# Patient Record
Sex: FEMALE | Race: WHITE | NOT HISPANIC OR LATINO | Employment: FULL TIME | URBAN - METROPOLITAN AREA
[De-identification: names, ages, dates, MRNs, and addresses within clinical notes are randomized per-mention and may not be internally consistent; named-entity substitution may affect disease eponyms.]

---

## 2018-03-14 ENCOUNTER — OFFICE VISIT - HEALTHEAST (OUTPATIENT)
Dept: FAMILY MEDICINE | Facility: CLINIC | Age: 33
End: 2018-03-14

## 2018-03-14 ENCOUNTER — COMMUNICATION - HEALTHEAST (OUTPATIENT)
Dept: TELEHEALTH | Facility: CLINIC | Age: 33
End: 2018-03-14

## 2018-03-14 DIAGNOSIS — R51.9 HEADACHE: ICD-10-CM

## 2018-03-14 DIAGNOSIS — L72.3 SEBACEOUS CYST: ICD-10-CM

## 2018-03-14 DIAGNOSIS — D22.9 CHANGE IN MOLE: ICD-10-CM

## 2018-03-14 RX ORDER — CHLORAL HYDRATE 500 MG
2 CAPSULE ORAL DAILY
Status: ON HOLD | COMMUNITY
Start: 2018-03-14 | End: 2021-11-16

## 2018-03-14 ASSESSMENT — MIFFLIN-ST. JEOR: SCORE: 1420.23

## 2019-04-28 ENCOUNTER — RECORDS - HEALTHEAST (OUTPATIENT)
Dept: ADMINISTRATIVE | Facility: OTHER | Age: 34
End: 2019-04-28

## 2019-04-28 ENCOUNTER — ANESTHESIA - HEALTHEAST (OUTPATIENT)
Dept: OBGYN | Facility: HOSPITAL | Age: 34
End: 2019-04-28

## 2021-05-03 LAB
ABO (EXTERNAL): NORMAL
HEMOGLOBIN (EXTERNAL): 10.8 G/DL (ref 12–16)
HEPATITIS B SURFACE ANTIGEN (EXTERNAL): NONREACTIVE
HIV1+2 AB SERPL QL IA: NONREACTIVE
PLATELET COUNT (EXTERNAL): 278 10E3/UL (ref 150–400)
RH (EXTERNAL): POSITIVE
RUBELLA ANTIBODY IGG (EXTERNAL): NORMAL
TREPONEMA PALLIDUM ANTIBODY (EXTERNAL): NONREACTIVE
VDRL (SYPHILIS) (EXTERNAL): NONREACTIVE

## 2021-05-28 NOTE — ANESTHESIA PROCEDURE NOTES
Epidural Block    Patient location during procedure: OB  Time Called: 4/28/2019 8:35 AM  Reason for Block:labor epidural  Staffing:  Performing  Anesthesiologist: Farhana Turner MD  Preanesthetic Checklist  Completed: patient identified, risks, benefits, and alternatives discussed, timeout performed, consent obtained, at patient's request, airway assessed, oxygen available, suction available, emergency drugs available and hand hygiene performed  Procedure  Patient position: sitting  Prep: ChloraPrep and site prepped and draped  Patient monitoring: continuous pulse oximetry, heart rate and blood pressure  Approach: midline  Location: L2-L3  Injection technique: VERO saline  Number of Attempts:1  Needle  Needle type: Krystian   Needle gauge: 18 G     Catheter in Space: 6  Assessment  Sensory level:  No complications

## 2021-05-28 NOTE — ANESTHESIA POSTPROCEDURE EVALUATION
Patient: Cleopatra Peña    Anesthesia type: epidural    Patient location: Labor and Delivery  Last vitals: No vitals data found for the desired time range.    Post vital signs: stable  Level of consciousness: awake and responds to simple questions  Post-anesthesia pain: pain controlled  Post-anesthesia nausea and vomiting: no  Pulmonary: unassisted, return to baseline  Cardiovascular: stable and blood pressure at baseline  Hydration: adequate  Anesthetic events: no    QCDR Measures:  ASA# 11 - Iliana-op Cardiac Arrest: ASA11B - Patient did NOT experience unanticipated cardiac arrest  ASA# 12 - Iliana-op Mortality Rate: ASA12B - Patient did NOT die  ASA# 13 - PACU Re-Intubation Rate: NA - No ETT / LMA used for case  ASA# 10 - Composite Anes Safety: ASA10A - No serious adverse event    Additional Notes:

## 2021-05-28 NOTE — ANESTHESIA PREPROCEDURE EVALUATION
Anesthesia Evaluation      Patient summary reviewed   No history of anesthetic complications     Airway   Mallampati: II  Neck ROM: full   Pulmonary - negative ROS and normal exam                          Cardiovascular - negative ROS and normal exam   Neuro/Psych - negative ROS     Endo/Other    (+) pregnant     GI/Hepatic/Renal - negative ROS           Dental                         Anesthesia Plan  Planned anesthetic: epidural    ASA 2     Anesthetic plan and risks discussed with: patient    Post-op plan: epidural analgesia

## 2021-06-01 VITALS — WEIGHT: 162.13 LBS | HEIGHT: 65 IN | BODY MASS INDEX: 27.01 KG/M2

## 2021-06-16 PROBLEM — Z34.90 PREGNANT: Status: ACTIVE | Noted: 2019-04-28

## 2021-06-16 NOTE — PROGRESS NOTES
"  Chief Complaint   Patient presents with     Mass     Headache     Light headed, dizzy     Nevus     face and back         HPI:   Cleopatra Peña is a 32 y.o. female has bump on head for several years.  Has enlarged over the last year.  Slightly tender.  In the past when she had it checked, they didn't think it was a problem.  Has felt lightheaded with more headaches over the last month.  Has had some palpitations.  This has been over the last month.  \"hard to concentrate\" \"hard to focus\"  Has noticed this over the last month. Frontal sometimes back of neck location of headache.  Headache lasts a couple of hours.  Gets headache most days, usually later in day.  Usually associated with fatigued. No nausea.  No numbness or tingling.  No weakness of hand/leg.  Not always all at the same time.  No medication. HA  Similar to what she has had in the past, but much more recently.  No family history of headaches.  No fever.  Has a couple of moles of face that she thinks are changing color.  Also thinks she has had change on mole on back.    Moved here from [place] within last year.  New job--transitioning is stressful.  Has kids age 4 and 1.5 years.  Denies sadness, anhedonia, sleeping problems, change in appetite, suicidal ideation.  Denies anxiety.      ROS:  Constitutional: No fatigue, weakness, weight loss or gain, fevers, night sweats   Eyes: as per HPi  ENT: no hearing loss, ear pain, tinnitus or aural discharge. no sore throat, dental pain, hoarseness, dysphagia, oral or tongue lesions.    no nasal stuffiness, discharge, coryza or bleeding. No sinus pain.    Respiratory: No cough, chest pain, dyspnea, wheezing or hemoptysis.      CV: No exertional chest pain, dyspnea, palpitations, syncope, orthopnea, edema or paroxysmal nocturnal dyspnea.   GI: no abdominal or flank pain, anorexia, nausea or vomiting, dysphagia, change in bowel habits or black or bloody stools or weight loss.   : negative   SKIN: as per " "HPI  MS: No muscular pain, tenderness, redness, stiffness, swelling or pain in joints of upper or lower extremities, restriction in movement in extremities, injuries, back pain.    NEURO: as per HPi  HEME/LYMPH: No abnormal bruising or abnormal bleeding. No node swelling.   ENDO: No heat or cold intolerance.  No polyuria, polyphagia, polydipsia      Medications:  No current outpatient prescriptions on file prior to visit.     No current facility-administered medications on file prior to visit.          Social History:  Social History   Substance Use Topics     Smoking status: Never Smoker     Smokeless tobacco: Never Used     Alcohol use Not on file         Physical Exam:   Vitals:    03/14/18 0954   BP: 112/68   Pulse: 68   Resp: 16   Temp: 98.1  F (36.7  C)   TempSrc: Oral   Weight: 162 lb 2 oz (73.5 kg)   Height: 5' 5.25\" (1.657 m)       GENERAL:   Alert. Oriented.  EYES: Clear  HENT:  Ears: R TM pearly gray. Normal landmarks. L TM pearly gray.  Normal landmarks  Nose: Clear.  Sinuses: Nontender.  Oropharynx:  No erythema. No exudate.  NECK: Supple. No adenopathy.  LUNGS: Clear to ascultation.  No crackles.  No wheezing  HEART: RRR  SKIN:   2 mm raised brown papule on right chin, 3 mm raised brown papule on right chin, 4 mm raised brown papule on right back.  Margins smooth on all with even coloring throughout  5 mm flesh colored subq nodule on right side of top of scalp.  No fluctuance, no tenderness.  No erythema  ABDOMEN:  +BS. No tenderness. Soft, no guarding, rebound, rigidity,mass, or organomegaly. No CVA tenderness    NEURO: CN II-XII intact  Motor 5/5 throughout  Sensation intact to light touch  DTR's 2/4 throughout  Rapid alternating movements intact.  Finger to nose normal.  Heel to shin normal.  Romberg negative.  No pronator drift.  Heel toe walk normal.       Assessment/Plan:    1. Change in mole  Three moles that she thinks are changing in color.  They are all raised, small, smooth bordered and have " even color distribution.  Discussed that I don't think they look malignant.  Given a referral to dermatology if she doesn't want to continue to observe  - Ambulatory referral to Dermatology    2. Sebaceous cyst  Discussed that this is a benign lesion.  She can have it looked at by derm if desired.  Should be rechecked if drains, becomes red/inflammed or enlarges    3. Headache  No alarming signs or symptoms.  Likely related to stress/anxiety.  She was quite teary at the end of visit, but denies feeling depressed and other symptoms of depression.  Discussed stress reduction/relaxation, regular exercise.  Ice to back of neck and stretching exercises.  Offered counseling if desired.  Recheck for problems.           Meryl Pratt MD      3/14/2018

## 2021-08-22 ENCOUNTER — HEALTH MAINTENANCE LETTER (OUTPATIENT)
Age: 36
End: 2021-08-22

## 2021-08-24 ENCOUNTER — HOSPITAL ENCOUNTER (OUTPATIENT)
Facility: HOSPITAL | Age: 36
End: 2021-08-24
Payer: COMMERCIAL

## 2021-10-17 ENCOUNTER — HEALTH MAINTENANCE LETTER (OUTPATIENT)
Age: 36
End: 2021-10-17

## 2021-10-21 LAB — GROUP B STREPTOCOCCUS (EXTERNAL): NEGATIVE

## 2021-11-15 ENCOUNTER — HOSPITAL ENCOUNTER (INPATIENT)
Facility: CLINIC | Age: 36
LOS: 1 days | Discharge: HOME | End: 2021-11-17
Attending: OBSTETRICS & GYNECOLOGY | Admitting: OBSTETRICS & GYNECOLOGY
Payer: COMMERCIAL

## 2021-11-15 DIAGNOSIS — Z3A.39 39 WEEKS GESTATION OF PREGNANCY: Primary | ICD-10-CM

## 2021-11-16 PROBLEM — Z36.89 ENCOUNTER FOR TRIAGE IN PREGNANT PATIENT: Status: ACTIVE | Noted: 2021-11-16

## 2021-11-16 PROBLEM — Z34.90 PREGNANCY: Status: ACTIVE | Noted: 2021-11-16

## 2021-11-16 LAB
ABO/RH(D): NORMAL
ANTIBODY SCREEN: NEGATIVE
HOLD SPECIMEN: NORMAL
HOLD SPECIMEN: NORMAL
SARS-COV-2 RNA RESP QL NAA+PROBE: NEGATIVE
SPECIMEN EXPIRATION DATE: NORMAL

## 2021-11-16 PROCEDURE — 722N000001 HC LABOR CARE VAGINAL DELIVERY SINGLE

## 2021-11-16 PROCEDURE — 250N000011 HC RX IP 250 OP 636

## 2021-11-16 PROCEDURE — 86900 BLOOD TYPING SEROLOGIC ABO: CPT

## 2021-11-16 PROCEDURE — C9803 HOPD COVID-19 SPEC COLLECT: HCPCS

## 2021-11-16 PROCEDURE — 120N000001 HC R&B MED SURG/OB

## 2021-11-16 PROCEDURE — 87635 SARS-COV-2 COVID-19 AMP PRB: CPT | Performed by: OBSTETRICS & GYNECOLOGY

## 2021-11-16 PROCEDURE — 36415 COLL VENOUS BLD VENIPUNCTURE: CPT

## 2021-11-16 PROCEDURE — 250N000013 HC RX MED GY IP 250 OP 250 PS 637: Performed by: OBSTETRICS & GYNECOLOGY

## 2021-11-16 PROCEDURE — 10907ZC DRAINAGE OF AMNIOTIC FLUID, THERAPEUTIC FROM PRODUCTS OF CONCEPTION, VIA NATURAL OR ARTIFICIAL OPENING: ICD-10-PCS | Performed by: OBSTETRICS & GYNECOLOGY

## 2021-11-16 RX ORDER — MISOPROSTOL 200 UG/1
400 TABLET ORAL
Status: DISCONTINUED | OUTPATIENT
Start: 2021-11-16 | End: 2021-11-16 | Stop reason: HOSPADM

## 2021-11-16 RX ORDER — NALOXONE HYDROCHLORIDE 0.4 MG/ML
0.4 INJECTION, SOLUTION INTRAMUSCULAR; INTRAVENOUS; SUBCUTANEOUS
Status: DISCONTINUED | OUTPATIENT
Start: 2021-11-16 | End: 2021-11-16 | Stop reason: HOSPADM

## 2021-11-16 RX ORDER — MISOPROSTOL 200 UG/1
400 TABLET ORAL
Status: DISCONTINUED | OUTPATIENT
Start: 2021-11-16 | End: 2021-11-17 | Stop reason: HOSPADM

## 2021-11-16 RX ORDER — METHYLERGONOVINE MALEATE 0.2 MG/ML
200 INJECTION INTRAVENOUS
Status: DISCONTINUED | OUTPATIENT
Start: 2021-11-16 | End: 2021-11-17 | Stop reason: HOSPADM

## 2021-11-16 RX ORDER — KETOROLAC TROMETHAMINE 30 MG/ML
30 INJECTION, SOLUTION INTRAMUSCULAR; INTRAVENOUS
Status: DISCONTINUED | OUTPATIENT
Start: 2021-11-16 | End: 2021-11-17 | Stop reason: HOSPADM

## 2021-11-16 RX ORDER — OXYTOCIN/0.9 % SODIUM CHLORIDE 30/500 ML
340 PLASTIC BAG, INJECTION (ML) INTRAVENOUS CONTINUOUS PRN
Status: DISCONTINUED | OUTPATIENT
Start: 2021-11-16 | End: 2021-11-17 | Stop reason: HOSPADM

## 2021-11-16 RX ORDER — IBUPROFEN 800 MG/1
800 TABLET, FILM COATED ORAL EVERY 6 HOURS PRN
Status: DISCONTINUED | OUTPATIENT
Start: 2021-11-16 | End: 2021-11-17 | Stop reason: HOSPADM

## 2021-11-16 RX ORDER — OXYTOCIN 10 [USP'U]/ML
10 INJECTION, SOLUTION INTRAMUSCULAR; INTRAVENOUS
Status: DISCONTINUED | OUTPATIENT
Start: 2021-11-16 | End: 2021-11-17 | Stop reason: HOSPADM

## 2021-11-16 RX ORDER — IBUPROFEN 600 MG/1
600 TABLET, FILM COATED ORAL
Status: DISCONTINUED | OUTPATIENT
Start: 2021-11-16 | End: 2021-11-17 | Stop reason: HOSPADM

## 2021-11-16 RX ORDER — NALOXONE HYDROCHLORIDE 0.4 MG/ML
0.2 INJECTION, SOLUTION INTRAMUSCULAR; INTRAVENOUS; SUBCUTANEOUS
Status: DISCONTINUED | OUTPATIENT
Start: 2021-11-16 | End: 2021-11-16 | Stop reason: HOSPADM

## 2021-11-16 RX ORDER — OXYTOCIN/0.9 % SODIUM CHLORIDE 30/500 ML
100-340 PLASTIC BAG, INJECTION (ML) INTRAVENOUS CONTINUOUS PRN
Status: DISCONTINUED | OUTPATIENT
Start: 2021-11-16 | End: 2021-11-17 | Stop reason: HOSPADM

## 2021-11-16 RX ORDER — CARBOPROST TROMETHAMINE 250 UG/ML
250 INJECTION, SOLUTION INTRAMUSCULAR
Status: DISCONTINUED | OUTPATIENT
Start: 2021-11-16 | End: 2021-11-16 | Stop reason: HOSPADM

## 2021-11-16 RX ORDER — ONDANSETRON 2 MG/ML
4 INJECTION INTRAMUSCULAR; INTRAVENOUS EVERY 6 HOURS PRN
Status: DISCONTINUED | OUTPATIENT
Start: 2021-11-16 | End: 2021-11-16 | Stop reason: HOSPADM

## 2021-11-16 RX ORDER — ACETAMINOPHEN 325 MG/1
650 TABLET ORAL EVERY 4 HOURS PRN
Status: DISCONTINUED | OUTPATIENT
Start: 2021-11-16 | End: 2021-11-16 | Stop reason: HOSPADM

## 2021-11-16 RX ORDER — PROCHLORPERAZINE 25 MG
25 SUPPOSITORY, RECTAL RECTAL EVERY 12 HOURS PRN
Status: DISCONTINUED | OUTPATIENT
Start: 2021-11-16 | End: 2021-11-16 | Stop reason: HOSPADM

## 2021-11-16 RX ORDER — LIDOCAINE 40 MG/G
CREAM TOPICAL
Status: DISCONTINUED | OUTPATIENT
Start: 2021-11-16 | End: 2021-11-16 | Stop reason: HOSPADM

## 2021-11-16 RX ORDER — METOCLOPRAMIDE HYDROCHLORIDE 5 MG/ML
10 INJECTION INTRAMUSCULAR; INTRAVENOUS EVERY 6 HOURS PRN
Status: DISCONTINUED | OUTPATIENT
Start: 2021-11-16 | End: 2021-11-16 | Stop reason: HOSPADM

## 2021-11-16 RX ORDER — MISOPROSTOL 200 UG/1
800 TABLET ORAL
Status: DISCONTINUED | OUTPATIENT
Start: 2021-11-16 | End: 2021-11-17 | Stop reason: HOSPADM

## 2021-11-16 RX ORDER — METOCLOPRAMIDE 10 MG/1
10 TABLET ORAL EVERY 6 HOURS PRN
Status: DISCONTINUED | OUTPATIENT
Start: 2021-11-16 | End: 2021-11-16 | Stop reason: HOSPADM

## 2021-11-16 RX ORDER — PROCHLORPERAZINE MALEATE 10 MG
10 TABLET ORAL EVERY 6 HOURS PRN
Status: DISCONTINUED | OUTPATIENT
Start: 2021-11-16 | End: 2021-11-16 | Stop reason: HOSPADM

## 2021-11-16 RX ORDER — OXYTOCIN/0.9 % SODIUM CHLORIDE 30/500 ML
340 PLASTIC BAG, INJECTION (ML) INTRAVENOUS CONTINUOUS PRN
Status: DISCONTINUED | OUTPATIENT
Start: 2021-11-16 | End: 2021-11-16 | Stop reason: HOSPADM

## 2021-11-16 RX ORDER — MODIFIED LANOLIN
OINTMENT (GRAM) TOPICAL
Status: DISCONTINUED | OUTPATIENT
Start: 2021-11-16 | End: 2021-11-17 | Stop reason: HOSPADM

## 2021-11-16 RX ORDER — METHYLERGONOVINE MALEATE 0.2 MG/ML
200 INJECTION INTRAVENOUS
Status: DISCONTINUED | OUTPATIENT
Start: 2021-11-16 | End: 2021-11-16 | Stop reason: HOSPADM

## 2021-11-16 RX ORDER — CARBOPROST TROMETHAMINE 250 UG/ML
250 INJECTION, SOLUTION INTRAMUSCULAR
Status: DISCONTINUED | OUTPATIENT
Start: 2021-11-16 | End: 2021-11-17 | Stop reason: HOSPADM

## 2021-11-16 RX ORDER — HYDROCORTISONE 2.5 %
CREAM (GRAM) TOPICAL 3 TIMES DAILY PRN
Status: DISCONTINUED | OUTPATIENT
Start: 2021-11-16 | End: 2021-11-17 | Stop reason: HOSPADM

## 2021-11-16 RX ORDER — OXYTOCIN 10 [USP'U]/ML
10 INJECTION, SOLUTION INTRAMUSCULAR; INTRAVENOUS
Status: DISCONTINUED | OUTPATIENT
Start: 2021-11-16 | End: 2021-11-16 | Stop reason: HOSPADM

## 2021-11-16 RX ORDER — ACETAMINOPHEN 325 MG/1
650 TABLET ORAL EVERY 4 HOURS PRN
Status: DISCONTINUED | OUTPATIENT
Start: 2021-11-16 | End: 2021-11-17 | Stop reason: HOSPADM

## 2021-11-16 RX ORDER — MISOPROSTOL 200 UG/1
800 TABLET ORAL
Status: DISCONTINUED | OUTPATIENT
Start: 2021-11-16 | End: 2021-11-16 | Stop reason: HOSPADM

## 2021-11-16 RX ORDER — ONDANSETRON 4 MG/1
4 TABLET, ORALLY DISINTEGRATING ORAL EVERY 6 HOURS PRN
Status: DISCONTINUED | OUTPATIENT
Start: 2021-11-16 | End: 2021-11-16 | Stop reason: HOSPADM

## 2021-11-16 RX ORDER — BISACODYL 10 MG
10 SUPPOSITORY, RECTAL RECTAL DAILY PRN
Status: DISCONTINUED | OUTPATIENT
Start: 2021-11-16 | End: 2021-11-17 | Stop reason: HOSPADM

## 2021-11-16 RX ORDER — DOCUSATE SODIUM 100 MG/1
100 CAPSULE, LIQUID FILLED ORAL DAILY
Status: DISCONTINUED | OUTPATIENT
Start: 2021-11-16 | End: 2021-11-17 | Stop reason: HOSPADM

## 2021-11-16 RX ORDER — FENTANYL CITRATE 50 UG/ML
50-100 INJECTION, SOLUTION INTRAMUSCULAR; INTRAVENOUS
Status: DISCONTINUED | OUTPATIENT
Start: 2021-11-16 | End: 2021-11-16 | Stop reason: HOSPADM

## 2021-11-16 RX ADMIN — DOCUSATE SODIUM 100 MG: 100 CAPSULE, LIQUID FILLED ORAL at 09:05

## 2021-11-16 RX ADMIN — IBUPROFEN 800 MG: 800 TABLET, FILM COATED ORAL at 12:46

## 2021-11-16 RX ADMIN — KETOROLAC TROMETHAMINE 30 MG: 30 INJECTION, SOLUTION INTRAMUSCULAR at 05:28

## 2021-11-16 RX ADMIN — Medication: at 20:04

## 2021-11-16 RX ADMIN — ACETAMINOPHEN 650 MG: 325 TABLET ORAL at 16:15

## 2021-11-16 RX ADMIN — ACETAMINOPHEN 650 MG: 325 TABLET ORAL at 09:06

## 2021-11-16 RX ADMIN — IBUPROFEN 800 MG: 800 TABLET, FILM COATED ORAL at 19:54

## 2021-11-16 ASSESSMENT — MIFFLIN-ST. JEOR: SCORE: 1649.3

## 2021-11-16 NOTE — H&P
OBSTETRICS ADMISSION HISTORY & PHYSICAL  DATE OF ADMISSION: 11/15/2021 11:42 PM        Assessment and Plan:   Assessment:   Cleopatra Peña is a 36 year old  at 39w5d in labor. Membranes are intact. GBS status is negative.   Patient Active Problem List   Diagnosis     Pregnant     Encounter for triage in pregnant patient     Pregnancy         PLAN:   1. Admit - see IP orders  2. GBS: negative  3. Intermittent fetal monitoring  4. Ambulation as wanted except if epidural is placed  5. Comfort plan: plan for natural birth with possible use of IV medications or epidural  6. Will monitor labor progress along with RN  7.   Will notify Dr. Sanders with changes    Patient discussed with attending physician, Dr. Shilpa Sanders, who agrees with the plan.     Milvia Ambrosio MD PGY1 2021  [facility]         Chief Complaint:     Contractions becoming more painful and closer in frequency       History of Present Illness:   Cleopatra Peña is a 36 year old year old  at 39w5d.    Patient received prenatal care with Marlen Reeder at [facility].      P[facility] to the [facility] for active labor management.    She reports irregular contractions starting at midnight of 11/15 and occurring every 6-20 minutes. Since then, the contractions have been coming increasingly more painful and closer together. At 2230 on , they began to occur ever 2-4 minutes. She denies fluid leakage. She reports bleeding per vagina. Fetal movement is normal.    Their prenatal course has been uncomplicated. Mother is advanced maternal age.    Prenatal labs   Lab Results   Component Value Date    GBS neg 10/18/2021       GBS was collected on 10/18/2021 and negative.  Weight gain during pregnancy: 22lbs              Obstetrical History:     OB History    Para Term  AB Living   4 3 3 0 0 1   SAB IAB Ectopic Multiple Live Births   0 0 0 0 1      #  Outcome Date GA Lbr Ruben/2nd Weight Sex Delivery Anes PTL Lv   4 Current            3 Term 04/28/19 39w3d 04:41 / 00:09 3.45 kg (7 lb 9.7 oz) M Vag-Spont EPI N MANPREET      Name: YONAS DINH-GEOFFREY      Apgar1: 8  Apgar5: 9   2 Term            1 Term                       Immunzations:     Most Recent Immunizations   Administered Date(s) Administered     COVID-19,PF,Moderna 03/26/2021     DT (PEDS <7y) 01/01/2002     Flu, Unspecified 11/16/2018     Td,adult,historic,unspecified 01/01/2002     Tdap (Adacel,Boostrix) 02/20/2019     Tdap this pregnancy?  YES - 8/23/2021 (not shown on [facility], but on prenatal notes)  Flu shot this pregnancy? YES - 09/28/2021 (not shown on [facility], but on prenatal notes)  COVID vaccine? YES - Date: Moderna 02/26/2021 and 03/26/2021.         Past Medical History:   No past medical history on file.         Past Surgical History:     Past Surgical History:   Procedure Laterality Date     WISDOM TOOTH EXTRACTION              Family History:     Family History   Problem Relation Age of Onset     Heart Disease Father      Hyperlipidemia Father      Hypertension Father      Diabetes Sister      Celiac Disease Sister      Diabetes Paternal Grandfather             Social History:   no tobacco use  no alcohol use  no illicit drug use         Medications:   No current facility-administered medications on file prior to encounter.  prenatal vitamin iron-folic acid 27mg-0.8mg (PRENATAL S) 27 mg iron- 800 mcg Tab tablet, [PRENATAL VITAMIN IRON-FOLIC ACID 27MG-0.8MG (PRENATAL S) 27 MG IRON- 800 MCG TAB TABLET] Take 1 tablet by mouth daily.             Allergies:   Gluten [gluten meal] and Doxycycline         Review of Systems:   CONSTITUTIONAL: no fatigue, no unexpected change in weight  SKIN: no worrisome rashes or lesions  EYES: no acute vision problems or changes  ENT: no ear problems, no mouth problems, no throat problems  RESP: no significant cough, no shortness of breath  CV: no chest pain, no  "palpitations, no new or worsening peripheral edema  GI: no nausea, no vomiting, no constipation, no diarrhea  : no frequency, no dysuria, no hematuria  NEURO: no weakness, no dizziness, no headaches  ENDOCRINE: no temperature intolerance, no skin/hair changes  PSYCHIATRIC: NEGATIVE for changes in mood or trouble with sleep         Physical Exam:   Vitals:   /59   Pulse 76   Temp 98.3  F (36.8  C) (Oral)   Resp 16   Ht 1.676 m (5' 6\")   Wt 95.3 kg (210 lb)   BMI 33.89 kg/m    210 lbs 0 oz  Estimated body mass index is 33.89 kg/m  as calculated from the following:    Height as of this encounter: 1.676 m (5' 6\").    Weight as of this encounter: 95.3 kg (210 lb).    GEN: Awake, alert in no apparent distress   HEENT: grossly normal  RESPIRATORY: clear to auscultation bilaterally, no increased work of breathing  BACK:  no costovertebral angle tenderness   CARDIOVASCULAR: regular rate and rhthym, no murmur  ABDOMEN: gravid  PELVIC: Per nursing: presenting part is head  Cervix: Per nursing 4-5/80/-2  EXT:  no edema or calf tenderness    Electronic Fetal Monitoring:  Baseline rate normal, 135  Variability moderate  Accelerations present  Decelerations not present  Uterine activity: irregular contractions every 2-5 minutes  Assessment: Category 1  "

## 2021-11-16 NOTE — L&D DELIVERY NOTE
OB Vaginal Delivery Note    Cleopatra Peña MRN# [id number]   Age: 36 year old YOB: 1985       GA: 39w5d  GP:   Labor Complications: Shoulder Dystocia   Delivery QBL: 100 mL  Delivery Type: Vaginal, Spontaneous   ROM to Delivery Time: (Delivered) Minutes: 25  Nada Weight:     1 Minute 5 Minute 10 Minute   Apgar Totals: 8   9        SHWETHA ADAIR;RADHA ARMIJO;WILLOW ROSADO;ESAU MARTI;KAREEN JENKINS     Delivery Details:  Cleopatra Peña, a 36 year old  female delivered a viable infant with apgars of 8  and 9 . Delivery was via vaginal, spontaneous to a sterile field under nitrous oxide anesthesia. Infant delivered in vertex  middle  occiput  anterior  position. Shoulder dystocia present. Posterior shoulder was manually delivered. Suprapubic pressure was added for delivery of anterior shoulder. The cord was clamped, cut once, and 3 vessels were noted. Cord blood was obtained in routine fashion with the following disposition: lab .      Cord complications: loose nuchal   Placenta delivered at 2021  4:58 AM . Placental disposition was Hospital disposal . Fundal massage performed and fundus found to be firm.     Episiotomy: none    Perineum, vagina, cervix were inspected, and the following lacerations were noted:   Perineal lacerations: none     Excellent hemostasis was noted. Needle count correct. Infant and patient in delivery room in good and stable condition.        Casey, Female-Cleopatra [[id number]]    Labor Event Times    Labor onset date: 11/15/21 Onset time: 12:30 AM   Dilation complete date: 21 Complete time:  4:36 AM   Start pushing date/time: 2021 0436      Labor Events     labor?: No   steroids: None  Labor Type: Spontaneous  Predominate monitoring during 1st stage: intermittent auscultation     Antibiotics received during labor?: No     Rupture date/time: 21   Rupture type: Artificial  Rupture of Membranes  Fluid color: Clear  Fluid odor: Normal     Augmentation: None  1:1 continuous labor support provided by?: RN       Delivery/Placenta Date and Time    Delivery Date: 21 Delivery Time:  4:52 AM   Placenta Date/Time: 2021  4:58 AM  Delivering clinician: Shilpa Sanders MD   Other personnel present at delivery:  Provider Role   Ora Prater, RN Delivery Nurse   Cary Medellin RN Registered Nurse   Isabell De La O RN Registered Nurse   Milvia Ambrosio MD Delivery Assist   Tamika Oro APRN CNP Nurse Practitioner         Vaginal Counts     Initial count performed by 2 team members:  Two Team Members   n/a       Needles Suture Needles Sponges (RETIRED) Instruments   Initial counts 0 0 0    Added to count       Relief counts       Final counts             Placed during labor Accounted for at the end of labor   FSE No NA   IUPC No NA   Cervadil No NA                  Final count correct?: Yes     Apgars    Living status: Living   1 Minute 5 Minute 10 Minute 15 Minute 20 Minute   Skin color: 0  1       Heart rate: 2  2       Reflex irritability: 2  2       Muscle tone: 2  2       Respiratory effort: 2  2       Total: 8  9       Apgars assigned by: JACK FRENCH     Cord    Vessels: 3 Vessels    Cord Complications: Nuchal   Nuchal Intervention: reduced         Nuchal cord description: loose nuchal cord         Cord Blood Disposition: Lab    Gases Sent?: No    Delayed cord clamping?: Yes    Cord Clamping Delay (seconds):  seconds    Stem cell collection?: No        Resuscitation    Methods: Suctioning  Output in Delivery Room: Stool      Measurements    Output in delivery room: Stool     Skin to Skin and Feeding Plan    Skin to skin initiation date/time: 1841    Skin to skin with: Mother  Skin to skin end date/time:        Labor Events and Shoulder Dystocia    Fetal Tracing Prior to Delivery: Category 1  Shoulder  dystocia present?: Pos  Anterior shoulder: right    Gentle attempt at traction, assisted by maternal expulsive forces?: Yes       First Maneuver: Delivery of the posterior arm       Second Maneuver:   Second maneuver: Suprapubic pressure     Delivery (Maternal) (Provider to Complete) ([id number])    Episiotomy: None  Perineal lacerations: None    Repair suture: None  Genital tract inspection done: Pos     Blood Loss  Mother: Cleopatra Peña #[id number]   Start of Mother's Information    Delivery Blood Loss  11/15/21 0030 - 11/16/21 0525    Delivery QBL (mL) Hospital Encounter 100 mL    Total  100 mL         End of Mother's Information  Mother: Cleopatra Peña #[id number]          Delivery - Provider to Complete ([id number])    Delivering clinician: Shilpa Sanders MD  Attempted Delivery Types (Choose all that apply): Spontaneous Vaginal Delivery  Delivery Type (Choose the 1 that will go to the Birth History): Vaginal, Spontaneous                   Other personnel:  Provider Role   Ora Prater, RN Delivery Nurse   Cary Medellin RN Registered Nurse   Isabell De La O RN Registered Nurse   Milvia Ambrosio MD Delivery Assist   Tamika Oro APRN CNP Nurse Practitioner                Placenta    Date/Time: 11/16/2021  4:58 AM  Removal: Spontaneous  Disposition: Hospital disposal           Anesthesia    Method: Nitrous Oxide                Presentation and Position    Presentation: Vertex    Position: Middle Occiput Anterior                 Milvia Ambrosio MD PGY1  [facility]

## 2021-11-16 NOTE — PROGRESS NOTES
RN at bedside for 15 minutes following Nitrous Oxide 50/50 inhalation initiation. Patient is observed using the equipment appropriately. Patient appears to be coping with labor. Patient is free of side effects.    Ora Prater RN

## 2021-11-16 NOTE — PLAN OF CARE
"  Problem: Adult Inpatient Plan of Care  Goal: Optimal Comfort and Wellbeing  Outcome: Improving     Fundus Firm M/U1, rubra, independent in kerrie cares. Denied nausea. Breast feeding baby and appears to be bonding well with baby. Reported minimal \"cramping\".   "

## 2021-11-17 VITALS
HEIGHT: 66 IN | RESPIRATION RATE: 16 BRPM | SYSTOLIC BLOOD PRESSURE: 122 MMHG | BODY MASS INDEX: 33.75 KG/M2 | WEIGHT: 210 LBS | OXYGEN SATURATION: 99 % | DIASTOLIC BLOOD PRESSURE: 74 MMHG | TEMPERATURE: 97.8 F | HEART RATE: 66 BPM

## 2021-11-17 PROCEDURE — 250N000013 HC RX MED GY IP 250 OP 250 PS 637: Performed by: OBSTETRICS & GYNECOLOGY

## 2021-11-17 RX ADMIN — ACETAMINOPHEN 650 MG: 325 TABLET ORAL at 00:48

## 2021-11-17 RX ADMIN — DOCUSATE SODIUM 100 MG: 100 CAPSULE, LIQUID FILLED ORAL at 08:57

## 2021-11-17 RX ADMIN — IBUPROFEN 800 MG: 800 TABLET, FILM COATED ORAL at 08:57

## 2021-11-17 NOTE — PROCEDURES
OB Vaginal Delivery Note     Cleopatra Peña MRN# [id number]   Age: 36 year old YOB: 1985         GA: 39w5d  GP:   Labor Complications: Shoulder Dystocia- mild, resolved with minimal maneuvers  Delivery QBL: 100 mL  Delivery Type: Vaginal, Spontaneous   ROM to Delivery Time: (Delivered) Minutes: 25   Weight:      1 Minute 5 Minute 10 Minute   Apgar Totals: 8   9        SHWETHA ADAIR;RADHA ARMIJO;WILLOW ROSADO;ESAU MARTI;KAREEN JENKINS      Delivery Details:  Cleopatra Peña, a 36 year old  female delivered a viable infant with apgars of 8  and 9 . Delivery was via vaginal, spontaneous to a sterile field under nitrous oxide anesthesia. Infant delivered in vertex  middle  occiput  anterior  position. Shoulder dystocia present.  Dustin position performed.  SP pressure.  Minimal rotation was performed as easiest maneuver at the time was to grasp and deliver the posterior arm.   MOUNA position, so L arm was the arm that was delivered.   The cord was clamped, cut once, and 3 vessels were noted. Cord blood was obtained in routine fashion with the following disposition: lab .       Cord complications: loose nuchal   Placenta delivered at 2021  4:58 AM . Placental disposition was Hospital disposal . Fundal massage performed and fundus found to be firm.      Episiotomy: none    Perineum, vagina, cervix were inspected, and the following lacerations were noted:   Perineal lacerations: none      Excellent hemostasis was noted. Needle count correct. Infant and patient in delivery room in good and stable condition.          Casey, Female-Cleopatra [[id number]]    Labor Event Times    Labor onset date: 11/15/21 Onset time: 12:30 AM   Dilation complete date: 21 Complete time:  4:36 AM   Start pushing date/time: 2021 0436       Labor Events     labor?: No   steroids: None  Labor Type: Spontaneous  Predominate  monitoring during 1st stage: intermittent auscultation      Antibiotics received during labor?: No      Rupture date/time: 21 0427   Rupture type: Artificial Rupture of Membranes  Fluid color: Clear  Fluid odor: Normal      Augmentation: None       1:1 continuous labor support provided by?: RN                 Delivery/Placenta Date and Time    Delivery Date: 21 Delivery Time:  4:52 AM   Placenta Date/Time: 2021  4:58 AM  Delivering clinician: Shilpa Sanders MD               Other personnel present at delivery:  Provider Role   Ora Prater, RN Delivery Nurse   Cary Medellin, RN Registered Nurse   Isabell De La O RN Registered Nurse   Milvia Ambrosio MD Delivery Assist   Tamika Oro APRN CNP Nurse Practitioner                                 Vaginal Counts                 Initial count performed by 2 team members:  Two Team Members   n/a                    Needles Suture Needles Sponges (RETIRED) Instruments   Initial counts 0 0 0     Added to count           Relief counts           Final counts                             Placed during labor Accounted for at the end of labor   FSE No NA   IUPC No NA   Cervadil No NA                           Final count correct?: Yes      Apgars    Living status: Living    1 Minute 5 Minute 10 Minute 15 Minute 20 Minute   Skin color: 0  1          Heart rate: 2  2          Reflex irritability: 2  2          Muscle tone: 2  2          Respiratory effort: 2  2          Total: 8  9          Apgars assigned by: JACK FRENCH              Cord    Vessels: 3 Vessels     Cord Complications: Nuchal   Nuchal Intervention: reduced         Nuchal cord description: loose nuchal cord         Cord Blood Disposition: Lab     Gases Sent?: No     Delayed cord clamping?: Yes     Cord Clamping Delay (seconds):  seconds     Stem cell collection?: No         [place] Resuscitation    Methods: Suctioning  Output in Delivery Room:  Stool       Measurements    Output in delivery room: Stool                Skin to Skin and Feeding Plan    Skin to skin initiation date/time: 1841     Skin to skin with: Mother  Skin to skin end date/time:           Labor Events and Shoulder Dystocia    Fetal Tracing Prior to Delivery: Category 1  Shoulder dystocia present?: Pos  Anterior shoulder: right     Gentle attempt at traction, assisted by maternal expulsive forces?: Yes             First Maneuver: Delivery of the posterior arm         Second Maneuver:   Second maneuver: Suprapubic pressure      Delivery (Maternal) (Provider to Complete) ([id number])    Episiotomy: None  Perineal lacerations: None     Repair suture: None  Genital tract inspection done: Pos                    Blood Loss  Mother: Cleopatra Peña #[id number]          Start of Mother's Information           Delivery Blood Loss  11/15/21 0030 - 21 0525            Delivery QBL (mL) Hospital Encounter 100 mL     Total   100 mL                     End of Mother's Information  Mother: Cleopatra Peña #[id number]                     Delivery - Provider to Complete ([id number])    Delivering clinician: Shilpa Sanders MD  Attempted Delivery Types (Choose all that apply): Spontaneous Vaginal Delivery  Delivery Type (Choose the 1 that will go to the Birth History): Vaginal, Spontaneous                       Other personnel:  Provider Role   Ora Prater, RN Delivery Nurse   Cary Medellin RN Registered Nurse   Isbaell De La O RN Registered Nurse   Milvia Ambrosio MD Delivery Assist   Tamika Oro APRN CNP Nurse Practitioner                  Placenta    Date/Time: 2021  4:58 AM  Removal: Spontaneous  Disposition: Hospital disposal              Anesthesia    Method: Nitrous Oxide                             Presentation and Position    Presentation: Vertex           Position: Middle Occiput Anterior                     Shlipa Sanders  , FACOG  11/17/2021 8:30 AM

## 2021-11-17 NOTE — PROGRESS NOTES
Cleopatrastephane Atkinsidalmis  [id number]  1985    Discharge follow-up plan discussed with patient, needs assessed. Patient requests follow-up phone call after discharge, declines home care visit. Phone number is correct. No additional needs identified at this time.    Completed by: Kim Seipel RN

## 2021-11-17 NOTE — PLAN OF CARE
Problem: Adjustment to Role Transition (Postpartum Vaginal Delivery)  Goal: Successful Maternal Role Transition  Outcome: Completed     Problem: Bleeding (Postpartum Vaginal Delivery)  Goal: Hemostasis  Outcome: Completed     VSS. Pt's pain well controlled. Pt to be discharged home per provider's orders.

## 2021-11-17 NOTE — PLAN OF CARE
Problem: Adjustment to Role Transition (Postpartum Vaginal Delivery)  Goal: Successful Maternal Role Transition  Outcome: Improving     Problem: Bleeding (Postpartum Vaginal Delivery)  Goal: Hemostasis  Outcome: Improving     Problem: Pain (Postpartum Vaginal Delivery)  Goal: Acceptable Pain Control  Outcome: Improving     Problem: Urinary Retention (Postpartum Vaginal Delivery)  Goal: Effective Urinary Elimination  Outcome: Improving    Pt is resting in bed. Pt is providing  cares. Pt vaginal bleeding is light with firm fundas. Call light within reach will continue to monitro

## 2022-10-02 ENCOUNTER — HEALTH MAINTENANCE LETTER (OUTPATIENT)
Age: 37
End: 2022-10-02

## 2023-10-21 ENCOUNTER — HEALTH MAINTENANCE LETTER (OUTPATIENT)
Age: 38
End: 2023-10-21

## 2023-11-08 PROCEDURE — 87624 HPV HI-RISK TYP POOLED RSLT: CPT | Performed by: STUDENT IN AN ORGANIZED HEALTH CARE EDUCATION/TRAINING PROGRAM

## 2023-11-08 PROCEDURE — G0145 SCR C/V CYTO,THINLAYER,RESCR: HCPCS | Performed by: STUDENT IN AN ORGANIZED HEALTH CARE EDUCATION/TRAINING PROGRAM

## 2023-11-09 ENCOUNTER — LAB REQUISITION (OUTPATIENT)
Dept: LAB | Facility: CLINIC | Age: 38
End: 2023-11-09

## 2023-11-09 DIAGNOSIS — Z01.419 ENCOUNTER FOR GYNECOLOGICAL EXAMINATION (GENERAL) (ROUTINE) WITHOUT ABNORMAL FINDINGS: ICD-10-CM

## 2023-11-14 LAB
BKR LAB AP GYN ADEQUACY: NORMAL
BKR LAB AP GYN INTERPRETATION: NORMAL
BKR LAB AP HPV REFLEX: NORMAL
BKR LAB AP LMP: NORMAL
BKR LAB AP PREVIOUS ABNL DX: NORMAL
BKR LAB AP PREVIOUS ABNORMAL: NORMAL
PATH REPORT.COMMENTS IMP SPEC: NORMAL
PATH REPORT.COMMENTS IMP SPEC: NORMAL
PATH REPORT.RELEVANT HX SPEC: NORMAL

## 2023-11-16 LAB
HUMAN PAPILLOMA VIRUS 16 DNA: NEGATIVE
HUMAN PAPILLOMA VIRUS 18 DNA: NEGATIVE
HUMAN PAPILLOMA VIRUS FINAL DIAGNOSIS: NORMAL
HUMAN PAPILLOMA VIRUS OTHER HR: NEGATIVE

## 2024-03-09 ENCOUNTER — HEALTH MAINTENANCE LETTER (OUTPATIENT)
Age: 39
End: 2024-03-09

## 2024-12-08 ENCOUNTER — HEALTH MAINTENANCE LETTER (OUTPATIENT)
Age: 39
End: 2024-12-08